# Patient Record
Sex: MALE | Race: WHITE | NOT HISPANIC OR LATINO | URBAN - METROPOLITAN AREA
[De-identification: names, ages, dates, MRNs, and addresses within clinical notes are randomized per-mention and may not be internally consistent; named-entity substitution may affect disease eponyms.]

---

## 2019-09-18 ENCOUNTER — APPOINTMENT (OUTPATIENT)
Dept: GENERAL RADIOLOGY | Facility: HOSPITAL | Age: 34
End: 2019-09-18

## 2019-09-18 ENCOUNTER — HOSPITAL ENCOUNTER (EMERGENCY)
Facility: HOSPITAL | Age: 34
Discharge: HOME | End: 2019-09-18
Attending: EMERGENCY MEDICINE | Admitting: EMERGENCY MEDICINE

## 2019-09-18 VITALS
HEIGHT: 77 IN | WEIGHT: 185 LBS | SYSTOLIC BLOOD PRESSURE: 118 MMHG | OXYGEN SATURATION: 98 % | TEMPERATURE: 98.3 F | DIASTOLIC BLOOD PRESSURE: 74 MMHG | HEART RATE: 72 BPM | RESPIRATION RATE: 15 BRPM | BODY MASS INDEX: 21.84 KG/M2

## 2019-09-18 DIAGNOSIS — R07.89 ATYPICAL CHEST PAIN: Primary | ICD-10-CM

## 2019-09-18 DIAGNOSIS — F41.9 ANXIETY: ICD-10-CM

## 2019-09-18 LAB
ALBUMIN SERPL-MCNC: 4.5 G/DL (ref 3.5–5.2)
ALBUMIN/GLOB SERPL: 1.7 G/DL
ALP SERPL-CCNC: 83 U/L (ref 39–117)
ALT SERPL W P-5'-P-CCNC: 10 U/L (ref 1–41)
ANION GAP SERPL CALCULATED.3IONS-SCNC: 8 MMOL/L (ref 5–15)
AST SERPL-CCNC: 15 U/L (ref 1–40)
BASOPHILS # BLD AUTO: 0.04 10*3/MM3 (ref 0–0.2)
BASOPHILS NFR BLD AUTO: 0.8 % (ref 0–1.5)
BILIRUB SERPL-MCNC: 0.4 MG/DL (ref 0.2–1.2)
BUN BLD-MCNC: 11 MG/DL (ref 6–20)
BUN/CREAT SERPL: 13.3 (ref 7–25)
CALCIUM SPEC-SCNC: 9.2 MG/DL (ref 8.6–10.5)
CHLORIDE SERPL-SCNC: 101 MMOL/L (ref 98–107)
CO2 SERPL-SCNC: 29 MMOL/L (ref 22–29)
CREAT BLD-MCNC: 0.83 MG/DL (ref 0.76–1.27)
D DIMER PPP FEU-MCNC: 0.12 MCGFEU/ML (ref 0–0.49)
DEPRECATED RDW RBC AUTO: 41.8 FL (ref 37–54)
EOSINOPHIL # BLD AUTO: 0.2 10*3/MM3 (ref 0–0.4)
EOSINOPHIL NFR BLD AUTO: 3.9 % (ref 0.3–6.2)
ERYTHROCYTE [DISTWIDTH] IN BLOOD BY AUTOMATED COUNT: 12 % (ref 12.3–15.4)
GFR SERPL CREATININE-BSD FRML MDRD: 105 ML/MIN/1.73
GLOBULIN UR ELPH-MCNC: 2.6 GM/DL
GLUCOSE BLD-MCNC: 121 MG/DL (ref 65–99)
HCT VFR BLD AUTO: 43.4 % (ref 37.5–51)
HGB BLD-MCNC: 14.1 G/DL (ref 13–17.7)
IMM GRANULOCYTES # BLD AUTO: 0.02 10*3/MM3 (ref 0–0.05)
IMM GRANULOCYTES NFR BLD AUTO: 0.4 % (ref 0–0.5)
INR PPP: 1.18 (ref 0.9–1.1)
LYMPHOCYTES # BLD AUTO: 1.26 10*3/MM3 (ref 0.7–3.1)
LYMPHOCYTES NFR BLD AUTO: 24.5 % (ref 19.6–45.3)
MAGNESIUM SERPL-MCNC: 2.2 MG/DL (ref 1.6–2.6)
MCH RBC QN AUTO: 30.5 PG (ref 26.6–33)
MCHC RBC AUTO-ENTMCNC: 32.5 G/DL (ref 31.5–35.7)
MCV RBC AUTO: 93.9 FL (ref 79–97)
MONOCYTES # BLD AUTO: 0.35 10*3/MM3 (ref 0.1–0.9)
MONOCYTES NFR BLD AUTO: 6.8 % (ref 5–12)
NEUTROPHILS # BLD AUTO: 3.27 10*3/MM3 (ref 1.7–7)
NEUTROPHILS NFR BLD AUTO: 63.6 % (ref 42.7–76)
NRBC BLD AUTO-RTO: 0 /100 WBC (ref 0–0.2)
PLATELET # BLD AUTO: 184 10*3/MM3 (ref 140–450)
PMV BLD AUTO: 11.5 FL (ref 6–12)
POTASSIUM BLD-SCNC: 3.9 MMOL/L (ref 3.5–5.2)
PROT SERPL-MCNC: 7.1 G/DL (ref 6–8.5)
PROTHROMBIN TIME: 14.7 SECONDS (ref 11.7–14.2)
RBC # BLD AUTO: 4.62 10*6/MM3 (ref 4.14–5.8)
SODIUM BLD-SCNC: 138 MMOL/L (ref 136–145)
T4 SERPL-MCNC: 6.41 MCG/DL (ref 4.5–11.7)
TROPONIN T SERPL-MCNC: <0.01 NG/ML (ref 0–0.03)
TSH SERPL DL<=0.05 MIU/L-ACNC: 0.68 UIU/ML (ref 0.27–4.2)
WBC NRBC COR # BLD: 5.14 10*3/MM3 (ref 3.4–10.8)

## 2019-09-18 PROCEDURE — 99283 EMERGENCY DEPT VISIT LOW MDM: CPT

## 2019-09-18 PROCEDURE — 93005 ELECTROCARDIOGRAM TRACING: CPT | Performed by: NURSE PRACTITIONER

## 2019-09-18 PROCEDURE — 71046 X-RAY EXAM CHEST 2 VIEWS: CPT

## 2019-09-18 PROCEDURE — 93010 ELECTROCARDIOGRAM REPORT: CPT | Performed by: INTERNAL MEDICINE

## 2019-09-18 PROCEDURE — 85379 FIBRIN DEGRADATION QUANT: CPT | Performed by: NURSE PRACTITIONER

## 2019-09-18 PROCEDURE — 84436 ASSAY OF TOTAL THYROXINE: CPT | Performed by: EMERGENCY MEDICINE

## 2019-09-18 PROCEDURE — 83735 ASSAY OF MAGNESIUM: CPT | Performed by: EMERGENCY MEDICINE

## 2019-09-18 PROCEDURE — 84484 ASSAY OF TROPONIN QUANT: CPT | Performed by: EMERGENCY MEDICINE

## 2019-09-18 PROCEDURE — 85610 PROTHROMBIN TIME: CPT | Performed by: EMERGENCY MEDICINE

## 2019-09-18 PROCEDURE — 80053 COMPREHEN METABOLIC PANEL: CPT | Performed by: EMERGENCY MEDICINE

## 2019-09-18 PROCEDURE — 85025 COMPLETE CBC W/AUTO DIFF WBC: CPT | Performed by: EMERGENCY MEDICINE

## 2019-09-18 PROCEDURE — 84443 ASSAY THYROID STIM HORMONE: CPT | Performed by: EMERGENCY MEDICINE

## 2019-09-18 RX ORDER — HYDROXYZINE PAMOATE 50 MG/1
50 CAPSULE ORAL 3 TIMES DAILY PRN
Qty: 20 CAPSULE | Refills: 0 | Status: SHIPPED | OUTPATIENT
Start: 2019-09-18

## 2019-09-18 NOTE — ED NOTES
SPOKE WITH YAIR IN LAB. SHE STATES SHE HAS THE BLUE TOP AND WILL RUN THE D-DIMER NOW.      Vinicio Talavera RN  09/18/19 1335

## 2019-09-18 NOTE — ED PROVIDER NOTES
"EMERGENCY DEPARTMENT ENCOUNTER    Room Number:    Date seen:  2019  Time seen: 6:11 AM  PCP: Provider, No Known    HPI:  Chief complaint: generalized weakness and tremors  Context:Mike Grover is a 34 y.o. male who presents to the ED with c/o generalized weakness, tremors, and \"a flush sensation in my arm and face\" that began around 2200 yesterday evening. The patient reports his symptoms are episodic. The patient states the episodes of symptoms occur randomly, and he denies any aggravating factors. The patient also states he has had transient episodes (typically last one second) of sharp anterior left-sided chest discomfort and sleep disturbance s/t waking up from intermittent heart palpitations (\"heart racing\") for the past month. Pt reports he has been evaluated at [facility] ED twice within the last month. During his visits, he had an EKG, blood work and XR done which were all negative and was then diagnosed with anxiety. He admits he is under some stressors that may contribute to his symptoms. He also admits to taking his mother's Hydroxyzine prior to arrival with improvement of his symptoms. He reports he has an appointment scheduled with his PMD tomorrow at which time they will further discuss his possible anxiety, but he wanted to come to the ER today to r/o any physical cause of his symptoms. Pt denies leg pain or swelling, cough, hemoptysis, fever, or shortness of breath. Pt denies any symptoms currently. He also denies hx of DVT, PE, HTN, hyperlipidemia, or diabetes. He also denies recent travel, recent surgeries, hormone use, hx of CA, or personal cardiac hx. However, the patient reports he has a strong family cardiac hx as his brother  from a MI in his 30's and his mother had a heart attack as well. The patient admits to vaping but denies smoking cigarettes. There are no other complaints at this time.    Onset: Gradual  Duration: began around 2200 yesterday evening  Timing: " "episodic  Character: anxiety  Alleviating Factors: He admits to taking his mother's Hydroxyzine prior to arrival with improvement of his symptoms.  Severity: Moderate    MEDICAL RECORD REVIEW     No recent ER visits listed.      ALLERGIES  Patient has no known allergies.    PAST MEDICAL HISTORY  Active Ambulatory Problems     Diagnosis Date Noted   • No Active Ambulatory Problems     Resolved Ambulatory Problems     Diagnosis Date Noted   • No Resolved Ambulatory Problems     No Additional Past Medical History       PAST SURGICAL HISTORY  No past surgical history on file.    FAMILY HISTORY  No family history on file.    SOCIAL HISTORY  Social History     Socioeconomic History   • Marital status: Single     Spouse name: Not on file   • Number of children: Not on file   • Years of education: Not on file   • Highest education level: Not on file       REVIEW OF SYSTEMS  Review of Systems   Constitutional: Negative for chills and fever.        Positive for \"a flush sensation in my arm and face\"    HENT: Negative.    Eyes: Negative.    Respiratory: Negative for cough (or hemoptysis) and shortness of breath.    Cardiovascular: Positive for chest pain (transient episodes (typically last one second) of sharp anterior left-sided chest discomfort) and palpitations (\"heart racing\"). Negative for leg swelling.   Gastrointestinal: Negative for abdominal pain and vomiting.   Genitourinary: Negative for dysuria and hematuria.   Musculoskeletal: Positive for myalgias (leg).   Skin: Negative for rash.   Neurological: Positive for tremors and weakness (generalized). Negative for syncope and headaches.   Psychiatric/Behavioral: Positive for sleep disturbance. Negative for confusion. The patient is nervous/anxious.        PHYSICAL EXAM  ED Triage Vitals [09/18/19 0043]   Temp Heart Rate Resp BP SpO2   97.9 °F (36.6 °C) 114 18 143/89 100 %      Temp src Heart Rate Source Patient Position BP Location FiO2 (%)   Tympanic Monitor -- -- -- "     Physical Exam   Constitutional: He is oriented to person, place, and time and well-developed, well-nourished, and in no distress. No distress.   HENT:   Head: Normocephalic and atraumatic.   Mouth/Throat: Mucous membranes are normal.   Eyes: Pupils are equal, round, and reactive to light.   Neck: Normal range of motion. Neck supple.   Cardiovascular: Normal rate, regular rhythm and normal heart sounds.   Pulmonary/Chest: Effort normal and breath sounds normal. No respiratory distress.   Abdominal: Soft. There is no tenderness. There is no rebound and no guarding.   Musculoskeletal: He exhibits no edema (BLE) or tenderness (Calf).   Neurological: He is alert and oriented to person, place, and time. He has normal strength.   Skin: Skin is warm, dry and intact.   Psychiatric: Mood, affect and judgment normal. His mood appears anxious.   Nursing note and vitals reviewed.      LAB RESULTS  Recent Results (from the past 24 hour(s))   D-dimer, Quantitative    Collection Time: 09/18/19  1:57 AM   Result Value Ref Range    D-Dimer, Quantitative 0.12 0.00 - 0.49 MCGFEU/mL   Comprehensive Metabolic Panel    Collection Time: 09/18/19  1:57 AM   Result Value Ref Range    Glucose 121 (H) 65 - 99 mg/dL    BUN 11 6 - 20 mg/dL    Creatinine 0.83 0.76 - 1.27 mg/dL    Sodium 138 136 - 145 mmol/L    Potassium 3.9 3.5 - 5.2 mmol/L    Chloride 101 98 - 107 mmol/L    CO2 29.0 22.0 - 29.0 mmol/L    Calcium 9.2 8.6 - 10.5 mg/dL    Total Protein 7.1 6.0 - 8.5 g/dL    Albumin 4.50 3.50 - 5.20 g/dL    ALT (SGPT) 10 1 - 41 U/L    AST (SGOT) 15 1 - 40 U/L    Alkaline Phosphatase 83 39 - 117 U/L    Total Bilirubin 0.4 0.2 - 1.2 mg/dL    eGFR Non African Amer 105 >60 mL/min/1.73    Globulin 2.6 gm/dL    A/G Ratio 1.7 g/dL    BUN/Creatinine Ratio 13.3 7.0 - 25.0    Anion Gap 8.0 5.0 - 15.0 mmol/L   Magnesium    Collection Time: 09/18/19  1:57 AM   Result Value Ref Range    Magnesium 2.2 1.6 - 2.6 mg/dL   Troponin    Collection Time: 09/18/19   1:57 AM   Result Value Ref Range    Troponin T <0.010 0.000 - 0.030 ng/mL   TSH    Collection Time: 09/18/19  1:57 AM   Result Value Ref Range    TSH 0.676 0.270 - 4.200 uIU/mL   T4    Collection Time: 09/18/19  1:57 AM   Result Value Ref Range    T4, Total 6.41 4.50 - 11.70 mcg/dL       I ordered the above labs and reviewed the results    RADIOLOGY  XR Chest 2 View   Preliminary Result    No active disease.                  I ordered the above noted radiological studies and reviewed the images on the PACS system.      MEDICATIONS GIVEN IN ER  Medications - No data to display    EKG  Interpreted by ED Physician Dr. Massey.    PROCEDURES  Procedures    HEART SCORE    History Slightly or non-suspicious (0)  ECG Normal (0)  Age < or = 45 (0)  Risk factors 1 or 2 (1)  Troponin < or = Normal limit (0)    This patient's HEART score is 1    HEART Score Key:  Scores 0-3: 0.9-1.7% risk of adverse cardiac event. In the HEART Score study, these patients were discharged (0.99% in the retrospective study, 1.7% in the prospective study)  Scores 4-6: 12-16.6% risk of adverse cardiac event. In the HEART Score study, these patients were admitted to the hospital. (11.6% retrospective, 16.6% prospective)  Scores ?7: 50-65% risk of adverse cardiac event. In the HEART Score study, these patients were candidates for early invasive measures. (65.2% retrospective, 50.1% prospective)        PROGRESS AND CONSULTS    Progress Notes:  0650: Reviewed labs drawn during Epic down time. CBC, CMP, TSH and T4, Magnesium, and Troponin were all negative.    0730: Discussed the patient and plan of care with Dr. Massey. After a bedside evaluation; they agree with the plan of care.    0750: Patient rechecked. Pt states that they feel improvement.   Patient is chest pain-free at this time and asymptomatic.  Chest pain sounds atypical, however considering family history, I discussed the importance of following up with cardiology for further evaluation.   "He states he has seen a cardiologist recently, but cannot remember the name.  I have given him [facility] to follow-up with.  I discussed today's findings with the patient, explaining any pertinent positives and negatives from today's visit, and the plan of care.  I answered any of the patient's questions.  They were given appropriate follow-up with family physician and/or specialist. Patient is aware that discharge does not mean there is nothing wrong, it indicates no emergency is present and they must continue their care with a primary care provider and/or specialist. I advised the patient that he will be discharged home with a prescription for Hydroxyzine to treat anxiety. Given instructions for BP recheck with PCP. I advised them on when to return to the ED for further evaluation. The patient verbalized understanding. The patient's history, physical exam, and lab findings were discussed with the physician, who also performed a face to face history and physical exam. The patient was discharged in stable condition.       CONSULTS  0737: Called lab. Spoke with them to verify the d-dimer was negative.    Disposition vitals:  /74   Pulse 72   Temp 98.3 °F (36.8 °C) (Tympanic)   Resp 15   Ht 195.6 cm (77\")   Wt 83.9 kg (185 lb)   SpO2 98%   BMI 21.94 kg/m²       DIAGNOSIS  Final diagnoses:   Atypical chest pain   Anxiety       FOLLOW UP   PATIENT LIAISON [facility] [id number] [phone]  Schedule an appointment as soon as possible for a visit       [facility]  [address]  [facility] [id number] [phone]  Schedule an appointment as soon as possible for a visit         RX     Medication List      New Prescriptions    hydrOXYzine pamoate 50 MG capsule  Commonly known as:  VISTARIL  Take 1 capsule by mouth 3 (Three) Times a Day As Needed for Anxiety.          After examining the available clinical information and " risks of prescribing controlled substances (including non-treatment or other adjunct treatments), it is considered medically appropriate that a controlled medication be prescribed.  I discussed risks/benefits/alternatives of using a controlled substance including risk of tolerance and dependence.  I discussed with patient (and family if applicable) that the patient should not drive, operate machinery, or otherwise engage in risky behavior as this medication may impair judgment and/or motor capabilities. I discussed that the patient will receive only a short-term controlled substance prescription for management of acute symptoms and that any additional medication needs should be addressed by the primary care provider or appropriate specialist.    Documentation assistance provided by tea Jung and Bertha Jarvis for Ning SANCHEZ.  Information recorded by the scribe was done at my direction and has been verified and validated by me.       Marion Jung  09/18/19 6470       Bertha Jarvis  09/18/19 0036       Ning Champion APRN  09/18/19 3435

## 2019-09-18 NOTE — ED NOTES
Pt arrived to ER during down time. Differences in times reflect pt being placed into Epic system after down time.     Carlene Piña RN  09/18/19 4288

## 2019-09-18 NOTE — DISCHARGE INSTRUCTIONS
-Home to Rest.     -Follow up with  a primary care provider and cardiology, call to make an appointment for ED follow up and further evaluation.     -Return to the ER for any worsening of your symptoms, fever, chest pain, dizziness, headache, weakness, fever, any new or other concerning symptoms.     We encourage all patients to follow up with your primary care provider for blood pressure recheck.  If you are a smoker, work on decreasing and stopping tobacco use. Follow up with your PCP to discuss medications that may assist in tobacco cessation if interested.

## 2019-09-18 NOTE — ED PROVIDER NOTES
Pt w/ reported hx of anxiety presents to the ED c/o intermittent CP starti    EKG          EKG time: 0240  Rhythm/Rate: NSR 62  P waves and CA: nml  QRS, axis: nml   ST and T waves: no acute ischemic changes     Interpreted Contemporaneously by me, independently view  No prior for comparison      On exam, the pt's lungs are clear bilat. Heart sounds nml, RRR, and no murmur.     Plan: discharge pt home.     Attestation:  The SUSAN and I have discussed this patient's history, physical exam, and treatment plan.  I have reviewed the documentation and personally had a face to face interaction with the patient. I affirm the documentation and agree with the treatment and plan.  The attached note describes my personal findings.       Documentation assistance provided by erin Knight for Dr. Massey.  Information recorded by the scribe was done at my direction and has been verified and validated by me.     Alexandra Knight  09/18/19 0804       Jeffry Massey MD  09/18/19 0337

## 2019-09-18 NOTE — ED NOTES
Pt c/o anxiety, SOB, and weakness for 2 wks. Pt states he has been to 2 different ER's this month for the same thing and is being diagnosed with anxiety. Pt fears he is being misdiagnosed.     Carlene Piña RN  09/18/19 2194       Carlene Piña RN  09/18/19 6710